# Patient Record
Sex: FEMALE | Race: AMERICAN INDIAN OR ALASKA NATIVE
[De-identification: names, ages, dates, MRNs, and addresses within clinical notes are randomized per-mention and may not be internally consistent; named-entity substitution may affect disease eponyms.]

---

## 2018-06-07 LAB
BASOPHILS # (AUTO): 0 K/MM3 (ref 0–0.1)
BASOPHILS NFR BLD AUTO: 0.6 % (ref 0–1.8)
EOSINOPHIL # BLD AUTO: 0 K/MM3 (ref 0–0.4)
EOSINOPHIL NFR BLD AUTO: 0.7 % (ref 0–4.3)
HCT VFR BLD CALC: 37.5 % (ref 30.3–42.9)
HGB BLD-MCNC: 12.3 GM/DL (ref 10.1–14.3)
LYMPHOCYTES # BLD AUTO: 1.5 K/MM3 (ref 1.2–5.4)
LYMPHOCYTES NFR BLD AUTO: 30.4 % (ref 13.4–35)
MCH RBC QN AUTO: 30 PG (ref 28–32)
MCHC RBC AUTO-ENTMCNC: 33 % (ref 30–34)
MCV RBC AUTO: 91 FL (ref 79–97)
MONOCYTES # (AUTO): 0.3 K/MM3 (ref 0–0.8)
MONOCYTES % (AUTO): 6.3 % (ref 0–7.3)
PLATELET # BLD: 196 K/MM3 (ref 140–440)
RBC # BLD AUTO: 4.12 M/MM3 (ref 3.65–5.03)

## 2018-06-07 NOTE — ANESTHESIA CONSULTATION
Anesthesia Consult and Med Hx


Date of service: 06/07/18





- Airway


Anesthetic Teeth Evaluation: Good


ROM Head & Neck: Adequate


Mental/Hyoid Distance: Adequate


Mallampati Class: Class I


Intubation Access Assessment: Good





- Pulmonary Exam


CTA: Yes





- Cardiac Exam


Cardiac Exam: RRR





- Pre-Operative Health Status


ASA Pre-Surgery Classification: ASA2


Proposed Anesthetic Plan: General

## 2018-06-11 NOTE — HISTORY AND PHYSICAL REPORT
History of Present Illness


Date of examination: 18


Chief complaint: 


1. Excessive and frequent menstruation with regular cycle


2. Endometrial mass


3. Fibroids of  uterus; Intramural


History of present illness: 


Past Pregnancy History 


   :      3


   Term Births:      3


   Living Children:   3





Pregnancy # 1


   Delivery date:     


   Delivery type:     


   Comments:      [id number]





Pregnancy # 2


   Delivery type:     





Pregnancy # 3


   Delivery type:     








GYN History 


Operations: Breast Biopsy: (L) cyst aspiration benign


(B) foot


polypectomy right vocal cord, removal of uvula d/t papilloma () benign


Colon polypectomy 2017, needs colonoscopy 


Abnormal PAP: negative


Uterine Anomaly: positive


 fibroids





Infection History 


Hx of STD: HSV





Active Medications:


IBUPROFEN 800 MG ORAL TABLET (IBUPROFEN) 1 po TID (PRN)


OXYCODONE-ACETAMINOPHEN 5-325 MG ORAL TABLET (OXYCODONE-ACETAMINOPHEN) 1-2po q6h


PRAVASTATIN SODIUM 20 MG ORAL TABLET (PRAVASTATIN SODIUM) 


SLOW FE TABLET EXTENDED RELEASE (FERROUS SULFATE DRIED CR-TABS) 


ASPIRIN ADULT LOW STRENGTH 81 MG ORAL TABLET DELAYED RELEASE (ASPIRIN) 


MULTIVITAMINS ORAL TABLET CHEWABLE (MULTIPLE VITAMINS-MINERALS) 


PROBIOTICS () 


FISH OIL CAPSULE (OMEGA-3 FATTY ACIDS CAPS) 





Current Allergies (reviewed today):


No known allergies








Past Medical History:


   Reviewed history from 2017 and no changes required:


      Neurologic Disorder:h/o TIA on  OC's


      seasonal allergies


      Hyperlipidemia 


      Colon polyp


      Anemia





Past Surgical History:


   Reviewed history from 2017 and no changes required:


      Breast Biopsy: (L) cyst aspiration benign


      (B) foot


      polypectomy right vocal cord, removal of uvula d/t papilloma () benign


      Colon polypectomy 2017, needs colonoscopy 





Family History Summary: 


   Reviewed history Last on 2017 and no changes required:2018


Other family member - Has No Family History of Biliary Tract Cancer - Entered On

: 2017


Other family member - Has No Family History of Breast Cancer - Entered On: 2017


Other family member - Has No Family History of Brain Cancer - Entered On: 2017


Other family member - Has No Family History of Colon Cancer - Entered On: 2017


Other family member - Has No Family History of DVT/PE on OCP - Entered On: 2017


Other family member - Has No Family History of Kidney/Urinary Tract Cancer - 

Entered On: 2017


Other family member - Has No Family History of Ovarvian Cancer - Entered On: 


Other family member - Has No Family History of Pancreatic Cancer - Entered On: 


Other family member - Has No Family History of Stomach Cancer - Entered On: 


Other family member - Has No Family History of Small Bowel Cancer - Entered On: 

2017


Other family member - Has No Family History of Uterine Cancer - Entered On: 





General Comments - FH:


No Family History of Breast Cancer


No Family History of Colon Cancer


No Family History of Ovarian Cancer


No Family History of DVT/PE on OCP








Social History:


   Reviewed history from 2017 and no changes required:


      Patient is 


      Smoking History:


      Patient has never smoked.








Risk Factors: 





Mammogram History: 


   Date of Last Mammogram:  2017





PAP Smear History: 


   Date of Last PAP Smear:  10/31/2016





Previous Tobacco Use: Signed On - 10/28/2016


Smoked Tobacco Use:  Never smoker


Smokeless Tobacco Use:  Never


Passive smoke exposure:  no


Drug use:  no





Previous Alcohol Use: Signed On - 10/28/2016


Alcohol use:  no


Exercise:  yes


   Times per week:  2


Seatbelt use:  100 %





Colonoscopy History:


   Date of Last Colonoscopy:  2017





Mammogram History: 


   Date of Last Mammogram:  2017





PAP Smear History: 


   Date of Last PAP Smear:  10/31/2016








Review of Systems 





General


     Denies fever, chills, sweats, anorexia, fatigue, weakness, malaise, weight 

loss and sleep disorder.








     Complains of menorrhagia and abnormal vaginal bleeding.


     Denies vaginal discharge, incontinence, dysuria, hematuria, urinary 

frequency, amenorrhea, pelvic pain, genital sores, decreased libido, painful 

periods, painful sex, urinary urgency, hot flashes, vaginal dryness, vaginal 

itching and vaginal odor.





CV


     Denies chest pains, palpitations, syncope, dyspnea on exertion, orthopnea, 

PND and peripheral edema.





Resp


     Denies cough, dyspnea at rest, excessive sputum, hemoptysis, wheezing and 

pleurisy.





GI


     Denies nausea, vomiting, diarrhea, constipation, change in bowel habits, 

abdominal pain, melena, hematochezia, jaundice, gas/bloating, indigestion/

heartburn, dysphagia and odynophagia.





Endo


     Denies cold intolerance, heat intolerance, polydipsia, polyphagia, 

polyuria and unusual weight change.





Breast


     Denies left breast lump, right breast lump, nipple discharge, bloody 

discharge from nipple, breast pain, abnormal mammogram and breast enlargement.





MS


     Denies back pain, joint pain, joint swelling, muscle cramps, muscle 

weakness, stiffness, arthritis, sciatica, restless legs, leg pain at night and 

leg pain with exertion.





Derm


     Denies rash, itching, dryness and suspicious lesions.





Neuro


     Denies paralysis, paresthesias, headache, seizures, tremors, vertigo, 

transient blindness, frequent falls, frequent headaches and difficulty walking.





Psych


     Denies depression, anxiety, irritability and mood swings.





Eyes


     Denies blurring, diplopia, irritation, discharge, vision loss, eye pain 

and photophobia.





ENT


     Denies earache, ear discharge, tinnitus, decreased hearing, nasal 

congestion, nosebleeds, sore throat and hoarseness.





Allergy


     Denies urticaria, allergic rash, hay fever and recurrent infections.





Heme


     Denies abnormal bruising, bleeding and enlarged lymph nodes.











Physical Exam 





Other Exams 


Lungs: no rales, rhonchi, or wheezes


Heart: S1, S2, no murmur, rub, or gallop


Abdomen: soft, non-tender, no masses, 


Skin: no ulcers, xanthomas


Extremities: normal alignment, no joint enlargement, crepitus, masses or 

tenderness; normal tone and strength





Genitourinary Exam 


Vagina: normal appearance, no discharge, lesions.  No evidence of cystocele or 

rectocele.


Cervix: normal appearance, no lesions, no discharge


Uterus: normal position, midline, mobile


Adnexa: no masses or tenderness











Impression & Recommendations:





Problem # 1:  Excessive and frequent menstruation with regular cycle (ICD-626.2

) (IMZ95-R20.0)


Diagnosis explained to patient . Questions answered. Discussed with patient 

various medical and surgical therapies common for treatment: Hormonal/medical 

therapy,endometrial ablation or hysterectomy. 








Problem # 2:  Fibroids of  uterus; Intramural (ICD-218.1) (UKA26-X77.1)


Diagnosis explained to patient . Questions answered. Discussed with patient 

various medical, surgical and radioloigal therapies common for treatment: 

Hormonal/medical therapy, fibroid embolization, removal of fibroids or 

hysterectomy She desires to proceed with hysterectomy


Consent reviewed and signed . Possible laparoscopy or laparotomy explained to 

patient. The risks and alternatives for this surgery were reviewed with the 

patient. She was informed of possible bleeding, infection, injury to bowel, 

bladder, ureters or other adjacent organs. She desires ovarian conservation. 

She was informed she may require surgery later to have her ovaries removed for 

a benign or mailgnant condition She was informed she will not be able to get 

pregnant after her uterus has been removed. 


The patient was instructed/informed the following:


   The normal length of hospital stay for this procedure.


   Nothing to eat or drink after midnight the evening prior to surgery. 


   Clear liquids the day before surgery.  


   Fleets enema the day prior to surgery. 


Pre-op instruction sheets given. Wound care instructions given. Infection 

precautions reviewed, patient to call for any signs or symptoms of infection. 

The usual discomforts associated with this procedure were detailed. Proper use 

of pain medicines was reviewed. Patient was given ample opportunity to have all 

her questions answered before signing informed consent.


She was instructed to stop ASA today. 











Problem # 3:  Endometrial mass (ICD-236.0) (ZKF74-Z98.0)








Medications Added to Medication List This Visit:


1)  Ibuprofen 800 Mg Oral Tablet (Ibuprofen) .... 1 po tid (prn)


2)  Oxycodone-acetaminophen 5-325 Mg Oral Tablet (Oxycodone-acetaminophen) .... 

1-2po q6h


3)  Pravastatin Sodium 20 Mg Oral Tablet (Pravastatin sodium)








Prescriptions:


IBUPROFEN 800 MG ORAL TABLET (IBUPROFEN) 1 po TID (PRN)  #30 x 0


   Entered and Authorized by:   Yue Pérez MD


   Electronically signed by:   Yue Pérez MD on 2018


   Method used:   Print then Give to Patient


   RxID:   [id number]


OXYCODONE-ACETAMINOPHEN 5-325 MG ORAL TABLET (OXYCODONE-ACETAMINOPHEN) 1-2po 

q6h  #30 x 0


   Entered and Authorized by:   Yue Pérez MD


   Electronically signed by:   Yue Pérez MD on 2018


   Method used:   Print then Give to Patient


   RxID:   [id number]











Medications and Allergies


 Allergies











Allergy/AdvReac Type Severity Reaction Status Date / Time


 


No Known Allergies Allergy   Unverified 18 12:20











 Home Medications











 Medication  Instructions  Recorded  Confirmed  Last Taken  Type


 


Aspirin [Aspirin EC] 81 mg PO DAILY 18 Unknown History


 


Ferrous Sulfate [Iron] 325 mg PO DAILY 18 Unknown History


 


Lactobacillus Combination No.8 1 each PO DAILY 18 Unknown History





[Adult Probiotic]     


 


Multivit with Calcium,Iron,Min 1 each PO DAILY 18 Unknown History





[Multiple Vitamins For Women]     


 


Omega-3 Fatty Acids/Fish Oil [Fish 1 each PO DAILY 18 Unknown 

History





Oil]     


 


Pravastatin [Pravachol] 20 mg PO QHS 18 Unknown History











Active Meds: 


Active Medications





Famotidine (Pepcid)  20 mg IV PREOP NR


   Stop: 18 12:01


Gabapentin (Neurontin)  300 mg PO PREOP NR


   Stop: 18 23:01


Lactated Ringer's (Lactated Ringers)  1,000 mls @ 100 mls/hr IV AS DIRECT LUIS


Cefazolin Sodium (Ancef/Sterile Water 2 Gm/20 Ml)  2 gm in 20 mls @ 80 mls/hr 

IV PREOP NR; Protocol


Midazolam HCl (Versed)  2 mg IV PREOP NR


   Stop: 18 23:59


Scopolamine (Transderm-Scop)  1 each TD PREOP NR


   Stop: 18 23:01











Exam


 Vital Signs











Temp Pulse Resp BP


 


 99.1 F   78   16   110/78 


 


 18 12:30  18 12:30  18 12:30  18 12:30














Results





- Labs





 18 12:40








Assessment and Plan





- Patient Problems


(1) Excessive and frequent menstruation with regular cycle


Status: Acute   





(2) Fibroids


Status: Acute   


Qualifiers: 


   Uterine leiomyoma location: intramural, submucous, and subserous   Qualified 

Code(s): D25.1 - Intramural leiomyoma of uterus; D25.0 - Submucous leiomyoma of 

uterus; D25.2 - Subserosal leiomyoma of uterus   





(3) Endometrial mass


Status: Acute

## 2018-06-12 ENCOUNTER — HOSPITAL ENCOUNTER (OUTPATIENT)
Dept: HOSPITAL 5 - OR | Age: 48
Setting detail: OBSERVATION
LOS: 1 days | Discharge: HOME | End: 2018-06-13
Attending: OBSTETRICS & GYNECOLOGY | Admitting: OBSTETRICS & GYNECOLOGY
Payer: COMMERCIAL

## 2018-06-12 DIAGNOSIS — D25.1: Primary | ICD-10-CM

## 2018-06-12 DIAGNOSIS — E78.5: ICD-10-CM

## 2018-06-12 DIAGNOSIS — N92.0: ICD-10-CM

## 2018-06-12 PROCEDURE — 96376 TX/PRO/DX INJ SAME DRUG ADON: CPT

## 2018-06-12 PROCEDURE — 86900 BLOOD TYPING SEROLOGIC ABO: CPT

## 2018-06-12 PROCEDURE — 86850 RBC ANTIBODY SCREEN: CPT

## 2018-06-12 PROCEDURE — G0378 HOSPITAL OBSERVATION PER HR: HCPCS

## 2018-06-12 PROCEDURE — 81025 URINE PREGNANCY TEST: CPT

## 2018-06-12 PROCEDURE — 85014 HEMATOCRIT: CPT

## 2018-06-12 PROCEDURE — 85018 HEMOGLOBIN: CPT

## 2018-06-12 PROCEDURE — C9113 INJ PANTOPRAZOLE SODIUM, VIA: HCPCS

## 2018-06-12 PROCEDURE — 96374 THER/PROPH/DIAG INJ IV PUSH: CPT

## 2018-06-12 PROCEDURE — 64450 NJX AA&/STRD OTHER PN/BRANCH: CPT

## 2018-06-12 PROCEDURE — A4217 STERILE WATER/SALINE, 500 ML: HCPCS

## 2018-06-12 PROCEDURE — 86901 BLOOD TYPING SEROLOGIC RH(D): CPT

## 2018-06-12 PROCEDURE — 88307 TISSUE EXAM BY PATHOLOGIST: CPT

## 2018-06-12 PROCEDURE — 85025 COMPLETE CBC W/AUTO DIFF WBC: CPT

## 2018-06-12 PROCEDURE — 58554 LAPARO-VAG HYST W/T/O COMPL: CPT

## 2018-06-12 PROCEDURE — 88302 TISSUE EXAM BY PATHOLOGIST: CPT

## 2018-06-12 PROCEDURE — 36415 COLL VENOUS BLD VENIPUNCTURE: CPT

## 2018-06-12 PROCEDURE — 96375 TX/PRO/DX INJ NEW DRUG ADDON: CPT

## 2018-06-12 RX ADMIN — SODIUM CHLORIDE SCH MLS/HR: 0.9 INJECTION, SOLUTION INTRAVENOUS at 15:15

## 2018-06-12 RX ADMIN — ACETAMINOPHEN SCH MG: 325 TABLET ORAL at 22:40

## 2018-06-12 RX ADMIN — ACETAMINOPHEN SCH MG: 325 TABLET ORAL at 13:30

## 2018-06-12 RX ADMIN — CEFAZOLIN SCH MLS/MIN: 10 INJECTION, POWDER, FOR SOLUTION INTRAVENOUS at 19:01

## 2018-06-12 RX ADMIN — SODIUM CHLORIDE SCH MLS/HR: 0.9 INJECTION, SOLUTION INTRAVENOUS at 22:40

## 2018-06-12 RX ADMIN — KETOROLAC TROMETHAMINE SCH MG: 30 INJECTION, SOLUTION INTRAMUSCULAR at 17:30

## 2018-06-12 NOTE — PROGRESS NOTE
Assessment and Plan





- Patient Problems


(1) History of robot-assisted laparoscopic hysterectomy


Current Visit: Yes   Status: Acute   


Plan to address problem: 


Findings and procedure explained. Plan of care discussed, she was instructed on 

how to use IC. Questions answered. She voiced understanding and agrees with 

plan of care








(2) Status post bilateral salpingectomy


Current Visit: Yes   Status: Acute   





(3) Vaginal laceration


Current Visit: Yes   Status: Acute   





(4) Excessive and frequent menstruation with regular cycle


Current Visit: No   Status: Resolved   





(5) Fibroids


Current Visit: No   Status: Resolved   


Qualifiers: 


   Uterine leiomyoma location: intramural, submucous, and subserous   Qualified 

Code(s): D25.1 - Intramural leiomyoma of uterus; D25.0 - Submucous leiomyoma of 

uterus; D25.2 - Subserosal leiomyoma of uterus   





(6) Endometrial mass


Current Visit: No   Status: Resolved   





Subjective





- Subjective


Date of service: 06/12/18


Principal diagnosis: DOS RATH with (B) s'gectomy, repair vaginal lac


Interval history: 


Patient resting in bed, states pain much better








Patient reports: pain well controlled





Objective





- Vital Signs


Latest vital signs: 


 Vital Signs











  Temp Pulse Resp BP BP Pulse Ox


 


 06/12/18 16:35  97.3 F L  80  20  112/73   100


 


 06/12/18 12:00  97.9 F  66  16   126/62 


 


 06/12/18 11:30   56 L  13  120/66   100


 


 06/12/18 11:15   64  24  122/65   100


 


 06/12/18 11:00   60  18  105/62   100


 


 06/12/18 10:55   61  18  104/64   100


 


 06/12/18 10:50   67  20  107/62   100


 


 06/12/18 10:47  97.2 F L  72  16  102/62   100


 


 06/12/18 07:50   70  14  113/61   100


 


 06/12/18 07:45   68  14  110/68   100


 


 06/12/18 07:38   68  14  113/64   100


 


 06/12/18 07:33   70  16  100/66   100


 


 06/12/18 07:28   68  16  101/62   100


 


 06/12/18 07:23   70  16  121/69   100


 


 06/12/18 07:18   64  18  112/64   100


 


 06/12/18 06:45  98.0 F  66  16  114/72   98


 


 06/12/18 06:40  98.0 F  66  16  114/72   98








 Intake and Output











 06/12/18 06/12/18 06/12/18





 06:59 14:59 22:59


 


Intake Total  450 240


 


Output Total  450 650


 


Balance  0 -410


 


Intake:   


 


  IV  450 


 


  Oral   240


 


Output:   


 


  Urine  450 650


 


    Indwelling Catheter   650


 


Other:   


 


  Total, Intake Amount   240


 


  Total, Output Amount   650


 


  Voiding Method Toilet Indwelling Catheter 














- Exam


Breasts: Present: deferred


Cardiovascular: Present: Regular rate


Lungs: Present: Clear to auscultation, Normal air movement


Abdomen: Present: normal appearance, soft, normal bowel sounds.  Absent: 

distention


Extremities: Present: normal.  Absent: tenderness, edema (Foot pumps working)


Incision: Present: normal, dry, intact

## 2018-06-12 NOTE — POST OPERATIVE NOTE
Pre-op diagnosis: menorrhagia


Post-op diagnosis: same


Procedure: 





RATH


Anesthesia: GETA


Surgeon: KEV BROWN


Assistant: LEFTY CEBALLOS


Estimated blood loss: minimal


Specimen disposition: to lab


Condition: stable


Disposition: PACU

## 2018-06-12 NOTE — OPERATIVE REPORT
Operative Report


Operative Report: 





Date:      06/12/2018





Preoperative diagnosis:   1. Excessive and frequent menstruation with regular 

cycle


         2. Endometrial mass


         3. Fibroids of  uterus; Intramural





Postoperative diagnosis:   1. Excessive and frequent menstruation with regular 

cycle


         2. Endometrial mass


         3. Fibroids of  uterus; Intramural


         4.  Vaginal laceration





Procedure:      1.  Robotic-assisted laparoscopic total hysterectomy with 

bilateral salpingectomy


         2.  Repair of vaginal laceration





Surgeon:      Yue Pérez MD





Assistant:      Leonor Shoemaker M.D.





Anesthesiologist:      INA Irene M.D.





Anesthesia:      General endotracheal anesthesia





EBL:      Approximately 75 mL 





Findings:      Exam under anesthesia revealed the uterus to be approximately 

14weeks.  Grossly normal tubes and ovary.  Uterus with multiple fibroids.  

Grossly normal appendix.





Procedure:      Patient was taken to the OR and placed in the supine position.  

General anesthesia was induced and an oral gastric tube was placed.  Her neck 

and head were placed on foam support.  Foam eye protection with goggles were 

secured in place.  Then foam face protection was placed and secured.  Foam 

shoulder pads were then positioned on her shoulders for Trendelenburg 

positioning.  She was then placed in dorsolithotomy position.  Exam under 

anesthesia as above.  The abdomen and vagina were then prepped and draped in 

the usual sterile fashion.  Timeout was performed.  A Linder catheter was 

inserted into the bladder with drainage of clear yellow urine.  The operative 

speculum was introduced into the vagina and the anterior lip of the cervix was 

grasped with single-toothed tenaculum.  The uterus was sounded to 11 cm.  The 

cervix was progressively dilated to allow the large V care uterine manipulator.

  The bulb of the manipulator was inflated and the speculum and tenaculum were 

removed.  The cup of the manipulator was placed around the cervix and the blue 

occluder of the manipulator was properly positioned in the vagina.  A 

laparotomy sponge that was saturated with a solution of polymyxin and saline 

was placed in the vagina to ensure pneumoperitoneum.  Sterile gloves were 

placed and attention was turned to the abdomen.





A 10 mm vertical supraumbilical incision was made approximately 10 cm superior 

to the elevated fundus of the uterus.  A 12 mm trocar with the laparoscope and 

camera attached was introduced through this incision under direct 

visualization.  The abdomen was insufflated.  No obvious bowel, bladder, 

ureteral, or major vascular injury was noted.  The patient was then placed in 

steep Trendelenburg position and the following trochars were placed under 

direct visualization: 8 mm robotic trochars were placed through incisions made 

in the bilateral midclavicular lower abdominal region approximately 10 cm 

lateral and approximately 2 cm below the midline incision, and a 5 mm trocar 

was placed through an incision made in the right lower lateral pelvis 

approximately 2 cm superior to the iliac crest.  The 10 mm laparoscope was then 

replaced by a 5 mm laparoscope that was placed through the 5 millimeter lateral 

trocar.  The 12 mm trocar was then removed in the Luisito Perez fascial 

closure device was placed through the incision and a 0 Vicryl was placed 

through the fascia.  Once the suture was secured the 12 mm trocar was 

reintroduced.  Once the trochars were in the appropriate positions,  the the [employer] robot system was engaged.  The EndoShears and bipolar device was placed 

through the 8 mm trochars and positioned then attention was turned to the 

console.  The uterus was elevated and bilateral salpingectomy was performed.  

Each tube was removed through the 5 mm trocar and sent to pathology in separate 

containers.  Then the utero-ovarian ligaments were clamped. cauterized and 

incised bilaterally using 30 W of energy.  Then the round ligaments were clamped

, cauterized and incised bilaterally.  The anterior leaf of the broad ligament 

was elevated and careful blunt and sharp dissection the bladder flap was 

created and dissected away from the lower uterine segment and cervix.  The 

posterior leaf of the broad ligament was dissected away from the uterine 

vessels.  The cup of the uterine manipulator was palpated both anteriorly and 

posteriorly.  Course of the ureters was visualized and was confirmed to be away 

from the operative field.  The uterine vessels were then clamped and cauterized 

bilaterally.  Blanching of the uterus was then noted.  Attention was again 

turned to the anterior lower uterine segment and the bladder was confirmed to 

be away from the operative field.  Then attention was turned again to the 

posterior where the cup of the manipulator was palpated and a colpotomy was 

performed down to the cup.  The incision was extended in the lateral position 

the uterine vessels that were again clamped and cauterized and incised.  

Continuing along the cup of the manipulator in a circumferential manner the 

colpotomy was completed.  The uterus and cervix were then removed through the 

vaginal incision.  The pelvis was irrigated with warm normal saline.  A moist 

laparotomy sponge was placed in the vagina to maintain pneumoperitoneum.  The 

vagina cuff was reapproximated using V  suture in a simple running 

stitch.  Then a J stitch was performed to secure the suture.  Again the pelvis 

was copiously irrigated with polymixin in warm normal saline.  The laparotomy 

sponge was removed from the vagina.  No bowel, bladder, ureteral or major 

vascular injury was noted.  





Once hemostasis was noted, platelet rich plasma was applied to the operative 

field to ensure hemostasis.  Then platelet poor plasma was applied to the 

operative field to decrease formation of adhesions.  Again hemostasis was 

noted.  Grossly normal appendix was noted.  Then the instruments were removed, 

the robot was disengaged.  The 12 mm trocar was removed and the fascia  was 

ligated with the 0 Vicryl suture that was placed at the beginning of the 

procedure.  The patient was taken out of Trendelenburg position, the abdomen 

was desufflated,  the remaining trochars were removed.  Incisions were 

reapproximated using 4-0 Vicryl in a subcuticular manner.  Surgiseal was placed 

over the incisions.  The vagina was then inspected, there was a small distal 

posterior vaginal laceration that was bleeding.  The laceration was repaired 

with 4-0 Vicryl in interrupted fashion.  Hemostasis was noted.  Clear yellow 

urine was draining into the Linder bag from the bladder at the end of the 

procedure.  





Counts were correct 3.  Patient was taken to recovery room in stable 

condition.

## 2018-06-13 VITALS — SYSTOLIC BLOOD PRESSURE: 106 MMHG | DIASTOLIC BLOOD PRESSURE: 62 MMHG

## 2018-06-13 LAB
HCT VFR BLD CALC: 36.4 % (ref 30.3–42.9)
HGB BLD-MCNC: 11.8 GM/DL (ref 10.1–14.3)

## 2018-06-13 RX ADMIN — SODIUM CHLORIDE SCH MLS/HR: 0.9 INJECTION, SOLUTION INTRAVENOUS at 06:27

## 2018-06-13 RX ADMIN — KETOROLAC TROMETHAMINE SCH MG: 30 INJECTION, SOLUTION INTRAMUSCULAR at 01:39

## 2018-06-13 RX ADMIN — ACETAMINOPHEN SCH MG: 325 TABLET ORAL at 06:25

## 2018-06-13 RX ADMIN — KETOROLAC TROMETHAMINE SCH MG: 30 INJECTION, SOLUTION INTRAMUSCULAR at 10:00

## 2018-06-13 RX ADMIN — CEFAZOLIN SCH MLS/MIN: 10 INJECTION, POWDER, FOR SOLUTION INTRAVENOUS at 04:25

## 2018-06-13 NOTE — DISCHARGE SUMMARY
Providers





- Providers


Date of Admission: 


06/12/18 11:29





Date of discharge: 06/13/18


Attending physician: 


KEV BROWN





Primary care physician: 


LISA HUTCHISON








Hospitalization


Condition: Good


Procedures: 





RATH, (B) s'pingectomy, repain of vaginal laceration


Hospital course: 





unremarkable


Disposition: DC-01 TO HOME OR SELFCARE





- Discharge Diagnoses


(1) History of robot-assisted laparoscopic hysterectomy


Status: Acute   





(2) Status post bilateral salpingectomy


Status: Acute   





(3) Vaginal laceration


Status: Acute   





Core Measure Documentation





- Palliative Care


Palliative Care/ Comfort Measures: Not Applicable





- Core Measures


Any of the following diagnoses?: none





Exam





- Constitutional


Vitals: 


 











Temp Pulse Resp BP Pulse Ox


 


 98.6 F   64   16   115/74   100 


 


 06/13/18 04:30  06/13/18 04:30  06/13/18 06:25  06/13/18 04:30  06/12/18 16:35











General appearance: Present: no acute distress





- Respiratory


Respiratory effort: normal


Respiratory: bilateral: CTA





- Cardiovascular


Rhythm: regular





- Extremities


Extremities: no ischemia, No edema





- Abdominal


General gastrointestinal: Present: soft, non-distended, normal bowel sounds


Female genitourinary: Present: deferred





- Integumentary


Integumentary: Present: clear, warm, dry (Incisions C/D/I, no hematoma present)





- Psychiatric


Psychiatric: appropriate mood/affect, intact judgment & insight





Plan


Activity: other (No sex, no driving, ambulate ~1mile on your property a day. 

Void frequently to avoid pressure on the vagina)


Weight Bearing Status: Weight Bear as Tolerated


Diet: regular (Eat small meals frequently, drink ~70oz water a day)


Wound: open to air, keep clean and dry


Special Instructions: no heavy lifting (>25#)


Follow up with: 


LISA HUTCHISON MD [Primary Care Provider] - 7 Days


KEV BROWN MD [Staff Physician] -  (as scheduled)

## 2022-09-28 ENCOUNTER — OFFICE VISIT (OUTPATIENT)
Dept: URBAN - METROPOLITAN AREA CLINIC 115 | Facility: CLINIC | Age: 52
End: 2022-09-28

## 2022-10-14 ENCOUNTER — OFFICE VISIT (OUTPATIENT)
Dept: URBAN - METROPOLITAN AREA SURGERY CENTER 13 | Facility: SURGERY CENTER | Age: 52
End: 2022-10-14
Payer: COMMERCIAL

## 2022-10-14 DIAGNOSIS — Z86.010 ADENOMAS PERSONAL HISTORY OF COLONIC POLYPS: ICD-10-CM

## 2022-10-14 PROCEDURE — G8907 PT DOC NO EVENTS ON DISCHARG: HCPCS | Performed by: INTERNAL MEDICINE

## 2022-10-14 PROCEDURE — 45378 DIAGNOSTIC COLONOSCOPY: CPT | Performed by: INTERNAL MEDICINE

## 2022-10-17 ENCOUNTER — TELEPHONE ENCOUNTER (OUTPATIENT)
Dept: URBAN - METROPOLITAN AREA CLINIC 92 | Facility: CLINIC | Age: 52
End: 2022-10-17